# Patient Record
Sex: FEMALE | Race: WHITE | NOT HISPANIC OR LATINO | URBAN - METROPOLITAN AREA
[De-identification: names, ages, dates, MRNs, and addresses within clinical notes are randomized per-mention and may not be internally consistent; named-entity substitution may affect disease eponyms.]

---

## 2017-10-31 ENCOUNTER — IMPORTED ENCOUNTER (OUTPATIENT)
Dept: URBAN - METROPOLITAN AREA CLINIC 31 | Facility: CLINIC | Age: 72
End: 2017-10-31

## 2017-10-31 PROBLEM — H25.13: Noted: 2017-10-31

## 2017-10-31 PROBLEM — H04.123: Noted: 2017-10-31

## 2017-10-31 PROCEDURE — 92014 COMPRE OPH EXAM EST PT 1/>: CPT

## 2017-10-31 PROCEDURE — 92015 DETERMINE REFRACTIVE STATE: CPT

## 2017-10-31 NOTE — PATIENT DISCUSSION
1.  Dry Eye OU:  Continue current management with Artificial Tears. 2.  Nuclear Sclerotic Cataract OU: Getting worse. Left > Right. Minimal improvement by changing rx. Pt wants to change rx and defer sx as long as possible. Using her silhouettes for dist reads with nothing and using old glasses for computers. Explained how cataracts can effect vision. Recommend clinical observation. The patient was advised to contact us if any change or worsening of vision. 3. Large Rx change. changed last yr and lasted 6 mths. .  4.   Return 1 yr CE

## 2018-10-31 ENCOUNTER — IMPORTED ENCOUNTER (OUTPATIENT)
Dept: URBAN - METROPOLITAN AREA CLINIC 31 | Facility: CLINIC | Age: 73
End: 2018-10-31

## 2018-10-31 PROBLEM — H25.13: Noted: 2018-10-31

## 2018-10-31 PROBLEM — H04.123: Noted: 2018-10-31

## 2018-10-31 PROCEDURE — 92014 COMPRE OPH EXAM EST PT 1/>: CPT

## 2018-10-31 PROCEDURE — 92015 DETERMINE REFRACTIVE STATE: CPT

## 2018-10-31 NOTE — PATIENT DISCUSSION
1.  Dry Eye OU:  Continue current management with Artificial Tears. 2.  Nuclear Sclerotic Cataract OU: Getting worse. Left > Right. Minimal improvement by changing rx. Pt wants to change rx and defer sx as long as possible. Using her silhouettes for dist reads with nothing and using old glasses for computers. 3.  Large Rx change past few yrs but not this yr  Cataracts getting worse. changed last yr and lasted 6 mths. .  4. Return 1 yr CE  --Will be needing sx in next yr. Getting knee sx 11/18.

## 2019-10-10 ENCOUNTER — IMPORTED ENCOUNTER (OUTPATIENT)
Dept: URBAN - METROPOLITAN AREA CLINIC 31 | Facility: CLINIC | Age: 74
End: 2019-10-10

## 2019-10-10 PROBLEM — H04.123: Noted: 2019-10-10

## 2019-10-10 PROBLEM — H25.13: Noted: 2019-10-10

## 2019-10-10 PROCEDURE — 92014 COMPRE OPH EXAM EST PT 1/>: CPT

## 2019-10-10 PROCEDURE — 92015 DETERMINE REFRACTIVE STATE: CPT

## 2019-10-10 NOTE — PATIENT DISCUSSION
1.  Dry Eye OU:  Continue current management with Artificial Tears. 2.  Nuclear Sclerotic Cataract OU: Getting worse. Left > Right. Minimal improvement by changing rx. Pt wants to change rx and defer sx as long as possible. Using her silhouettes for dist reads with nothing and using old glasses for computers. 3.  Minimal rx change --Pt feels left had changed alot. expl rx change will not improve alot. 4.  Return 1 yr CE  --Will be needing sx in next yr. Getting knee sx 11/19 on other knee.

## 2020-10-06 NOTE — PATIENT DISCUSSION
"Patient reassured. No need for workup.   has seen them ""OS for years"" and episodes are infrequent. "

## 2020-11-30 ENCOUNTER — IMPORTED ENCOUNTER (OUTPATIENT)
Dept: URBAN - METROPOLITAN AREA CLINIC 31 | Facility: CLINIC | Age: 75
End: 2020-11-30

## 2020-11-30 PROBLEM — H04.123: Noted: 2020-11-30

## 2020-11-30 PROBLEM — H18.513: Noted: 2020-11-30

## 2020-11-30 PROBLEM — H25.13: Noted: 2020-11-30

## 2020-11-30 PROCEDURE — 92015 DETERMINE REFRACTIVE STATE: CPT

## 2020-11-30 PROCEDURE — 92014 COMPRE OPH EXAM EST PT 1/>: CPT

## 2020-11-30 NOTE — PATIENT DISCUSSION
1.  Fuchs Dystrophy OU: Recommend clinical observation. Disc Clint 128 drops in affected eye if worsening. Will need ECC prior to cat sx. 2.  Dry Eye OU:  Continue current management with Artificial Tears. 3.  Nuclear Sclerotic Cataract OU: Getting worse. Left > Right. Minimal improvement by changing rx. Pt wants to schedule Cat faithroxanna Thomson --Disc IOL options with pt. Pt is interested in MF IOL but understands with a corneal problem she may not be a good canddiate. Pt would be fine with dist correction and use reading rx. 4.  Minimal rx change --Cannot improve visions with rx change. 5.  RTN 1 mth Cat eval OU DR Thomson --Pt is primary  in household.

## 2020-11-30 NOTE — PATIENT DISCUSSION
1.  Dry Eye OU:  Continue current management with Artificial Tears. 2.  Nuclear Sclerotic Cataract OU: Getting worse. Left > Right. Minimal improvement by changing rx. Pt wants to schedule cta eval DR Thomson 3. Minimal rx change --Pt feels left had changed alot. expl rx change will not improve alot. 4.  Return 1 yr CE  --Will be needing sx in next yr.

## 2020-12-17 ENCOUNTER — IMPORTED ENCOUNTER (OUTPATIENT)
Dept: URBAN - METROPOLITAN AREA CLINIC 31 | Facility: CLINIC | Age: 75
End: 2020-12-17

## 2020-12-17 PROBLEM — H25.13: Noted: 2020-12-17

## 2020-12-17 PROBLEM — H18.513: Noted: 2020-12-17

## 2020-12-17 PROCEDURE — 99213 OFFICE O/P EST LOW 20 MIN: CPT

## 2020-12-17 NOTE — PATIENT DISCUSSION
Fuchs Dystrophy OU: Recommend clinical observation. Advised use of Clint 128 drops in affected eye if worsening.

## 2020-12-17 NOTE — PATIENT DISCUSSION
1.  Discussed the risks benefits alternatives and limitations of cataract surgery including infection bleeding loss of vision retinal tears detachment. The patient stated a full understanding and a desire to proceed with the procedure in both eyes. Refractive options were reviewed. Patient has elected to be optimized for distance vision in both eyes. The patient will still need glasses for reading and to possibly fine tune distance vision. Viscoat Schedule KPE/IOL os/od 2 weeks apart2. Fuchs Dystrophy OU: Recommend clinical observation. Advised use of Clint 128 drops in affected eye if worsening. 3. Return for an appointment for [address] [place] [address]. with Dr. Yosef Serrato.

## 2021-01-08 ENCOUNTER — IMPORTED ENCOUNTER (OUTPATIENT)
Dept: URBAN - METROPOLITAN AREA CLINIC 31 | Facility: CLINIC | Age: 76
End: 2021-01-08

## 2021-01-08 PROBLEM — H18.513: Noted: 2021-01-08

## 2021-01-08 PROBLEM — H25.13: Noted: 2021-01-08

## 2021-01-08 PROCEDURE — 92286 ANT SGM IMG I&R SPECLR MIC: CPT

## 2021-01-08 PROCEDURE — 92136 OPHTHALMIC BIOMETRY: CPT

## 2021-01-08 NOTE — PATIENT DISCUSSION
1.  Discussed the risks benefits alternatives and limitations of cataract surgery including infection bleeding loss of vision retinal tears detachment. The patient stated a full understanding and a desire to proceed with the procedure in both eyes. Refractive options were reviewed. Patient has elected to be optimized for distance vision in both eyes. The patient will still need glasses for reading and to possibly fine tune distance vision. 2. Fuchs Dystrophy OU: Recommend clinical observation. Advised use of Clint 128 drops in affected eye if worsening.

## 2021-01-26 ENCOUNTER — IMPORTED ENCOUNTER (OUTPATIENT)
Dept: URBAN - METROPOLITAN AREA CLINIC 31 | Facility: CLINIC | Age: 76
End: 2021-01-26

## 2021-01-26 PROBLEM — Z96.1: Noted: 2021-01-26

## 2021-01-26 PROCEDURE — 99024 POSTOP FOLLOW-UP VISIT: CPT

## 2021-01-26 NOTE — PATIENT DISCUSSION
1.  Pseudophake OS--Post-Op Day #1 - Cataract Surgery Left Eye (OS) - doing well. Pt is thrilled--Tears prn. Continue postop drops as directed. Call office with symptoms of pain redness or decreased vision in operative eye.  1 drop of tobramycin instilled-- use tears prn and Pataday for itchig qd.2. Fuchs Dystrophy--Use Clint 3. Cat OD--scheduled 2 weeks. 4.   RTN 1 week PO

## 2021-02-02 ENCOUNTER — IMPORTED ENCOUNTER (OUTPATIENT)
Dept: URBAN - METROPOLITAN AREA CLINIC 31 | Facility: CLINIC | Age: 76
End: 2021-02-02

## 2021-02-02 PROBLEM — Z96.1: Noted: 2021-02-02

## 2021-02-02 PROCEDURE — 99024 POSTOP FOLLOW-UP VISIT: CPT

## 2021-02-02 NOTE — PATIENT DISCUSSION
1.  Pseudophake OS--  1 week- Cataract Surgery Left Eye (OS) - doing well. Pt is thrilled--Tears prn. Continue postop drops as directed. Call office with symptoms of pain redness or decreased vision in operative eye. -- use tears prn and Pataday for itchig qd.2. Fuchs Dystrophy--Use Clint 3. Cat OD--scheduled 2 weeks. 4.   RTN 1 week PO

## 2021-02-09 ENCOUNTER — IMPORTED ENCOUNTER (OUTPATIENT)
Dept: URBAN - METROPOLITAN AREA CLINIC 31 | Facility: CLINIC | Age: 76
End: 2021-02-09

## 2021-02-09 PROBLEM — Z96.1: Noted: 2021-02-09

## 2021-02-09 PROCEDURE — 99024 POSTOP FOLLOW-UP VISIT: CPT

## 2021-02-09 NOTE — PATIENT DISCUSSION
1.  Pseudophakia OD---Post-Op Day #1 - Cataract Surgery Right Eye (OD) -DIST---  Explained corneal edema will resolve. doing well. Tears prn. Continue postop drops as directed. Call office with symptoms of pain redness or decreased vision in operative eye. 1 drop tobramycin instilled. 2. Pseudophake OS--  2 week- Cataract Surgery Left Eye (OS) - doing well. Pt is thrilled--Tears prn. Continue postop drops as directed. Call office with symptoms of pain redness or decreased vision in operative eye. -- use tears prn and Pataday for itchig qd.3. Fuchs Dystrophy--Use Clint 4.   RTN 1 week PO

## 2021-02-16 ENCOUNTER — IMPORTED ENCOUNTER (OUTPATIENT)
Dept: URBAN - METROPOLITAN AREA CLINIC 31 | Facility: CLINIC | Age: 76
End: 2021-02-16

## 2021-02-16 PROBLEM — Z96.1: Noted: 2021-02-16

## 2021-02-16 PROCEDURE — 99024 POSTOP FOLLOW-UP VISIT: CPT

## 2021-02-16 NOTE — PATIENT DISCUSSION
1.  Pseudophakia OD-- 1 week PO- Cataract Surgery Right Eye (OD) -DIST--- Doing great-- Explained corneal edema will resolve. doing well. Tears prn. Continue postop drops as directed. Call office with symptoms of pain redness or decreased vision in operative eye. 2. Pseudophake OS--  3 week- Cataract Surgery Left Eye (OS) - doing well. Pt is thrilled--Tears prn. Continue postop drops as directed. Call office with symptoms of pain redness or decreased vision in operative eye. -- use tears prn and Pataday for itchig qd.3. Fuchs Dystrophy--Use Clint 4. Pt happy with using OTC 2.25.5. RTN 5 MTHS CE--after cat sx ou.

## 2021-10-20 ENCOUNTER — IMPORTED ENCOUNTER (OUTPATIENT)
Dept: URBAN - METROPOLITAN AREA CLINIC 31 | Facility: CLINIC | Age: 76
End: 2021-10-20

## 2021-10-20 PROBLEM — Z96.1: Noted: 2021-10-20

## 2021-10-20 PROBLEM — H18.513: Noted: 2021-10-20

## 2021-10-20 PROBLEM — H04.123: Noted: 2021-10-20

## 2021-10-20 PROBLEM — H25.13: Noted: 2021-10-20

## 2021-10-20 PROCEDURE — 92015 DETERMINE REFRACTIVE STATE: CPT

## 2021-10-20 PROCEDURE — 92014 COMPRE OPH EXAM EST PT 1/>: CPT

## 2022-03-10 ENCOUNTER — IMPORTED ENCOUNTER (OUTPATIENT)
Dept: URBAN - METROPOLITAN AREA CLINIC 31 | Facility: CLINIC | Age: 77
End: 2022-03-10

## 2022-03-10 PROBLEM — H26.491: Noted: 2022-03-10

## 2022-03-10 PROBLEM — Z96.1: Noted: 2022-03-10

## 2022-03-10 PROBLEM — H26.492: Noted: 2022-03-10

## 2022-03-10 PROCEDURE — 99214 OFFICE O/P EST MOD 30 MIN: CPT

## 2022-03-10 PROCEDURE — 92015 DETERMINE REFRACTIVE STATE: CPT

## 2022-03-10 NOTE — PATIENT DISCUSSION
1.  PCO  OD (Posterior Capsule Opacification)   PCO is visually significant and impairment of vision does not meet the patient’s functional needs or interferes with activities of daily living. Risks benefits and alternatives to the Nd:YAG Laser reviewed including elevated IOP immediately postop and retinal tear/detachment. Patient to notify their ophthalmologist promptly if they have a significant change in symptoms such as flashes of light (photopsia) an increase in floaters loss of visual field or decrease in visual acuity after the procedure. Patient will be scheduled in [place] [id number] for Nd:YAG Laser. 2. PCO  OS: (Posterior Capsule Opacification)  Not visually significant at this time. Monitor for yag capsulotomy necessity. 3. Pseudophakia OU - IOLs stable. Monitor for changes in vision. 4. Followup for Surgery paperwork. for Yag OD  Sched in [place] with Dr. Amberly Montoya.

## 2022-04-01 ASSESSMENT — TONOMETRY
OS_IOP_MMHG: 17
OD_IOP_MMHG: 17
OD_IOP_MMHG: 16
OS_IOP_MMHG: 16
OS_IOP_MMHG: 17
OD_IOP_MMHG: 17
OD_IOP_MMHG: 16
OS_IOP_MMHG: 13
OS_IOP_MMHG: 16
OD_IOP_MMHG: 22
OD_IOP_MMHG: 17
OD_IOP_MMHG: 16
OS_IOP_MMHG: 16
OS_IOP_MMHG: 17
OD_IOP_MMHG: 16
OD_IOP_MMHG: 16
OS_IOP_MMHG: 16
OS_IOP_MMHG: 18
OS_IOP_MMHG: 16
OD_IOP_MMHG: 16

## 2022-04-01 ASSESSMENT — VISUAL ACUITY
OS_SC: 20/20
OD_GLARE: 20/50MED
OD_GLARE: 20/30-2
OD_CC: 20/40-2
OD_CC: 20/400
OD_SC: 20/25+2
OS_GLARE: 20/100
OS_SC: 20/30-1
OS_GLARE: 20/40
OS_CC: 20/25-2
OS_CC: 20/25+1
OD_GLARE: 20/50
OS_GLARE: 20/30-1
OD_GLARE: 20/100
OD_SC: 20/30
OD_SC: 20/400
OD_SC: 20/30
OD_CC: 20/400
OS_CC: J1+
OD_CC: 20/400
OS_CC: 20/20+3
OD_CC: J1
OS_CC: 20/25
OS_SC: 20/20-1
OS_SC: 20/400
OS_GLARE: 20/25MED

## 2022-04-08 ENCOUNTER — POST-OP (OUTPATIENT)
Dept: URBAN - METROPOLITAN AREA CLINIC 34 | Facility: CLINIC | Age: 77
End: 2022-04-08

## 2022-04-08 ENCOUNTER — PREPPED CHART (OUTPATIENT)
Dept: URBAN - METROPOLITAN AREA CLINIC 34 | Facility: CLINIC | Age: 77
End: 2022-04-08

## 2022-04-08 DIAGNOSIS — Z98.890: ICD-10-CM

## 2022-04-08 PROCEDURE — 99024 POSTOP FOLLOW-UP VISIT: CPT

## 2022-04-08 ASSESSMENT — TONOMETRY
OD_IOP_MMHG: 16
OS_IOP_MMHG: 16

## 2022-04-08 ASSESSMENT — VISUAL ACUITY
OD_SC: 20/20
OS_SC: 20/20

## 2022-04-08 NOTE — PATIENT DISCUSSION
1.  Pseudophakia OU - IOLs stable. Early PCO OD OS clear capsule. Monitor for changes in vision. 2. Fuchs Dystrophy OU: Recommend clinical observation. Disc Clint 128 drops in affected eye if worsening. Will need ECC prior to cat sx. 3.  Dry Eye OU:  Continue current management with Artificial Tears. 4.  No rx changes 5. RTN 4/22 DF --eval of capsule oDLeaving for [place] 6/22. spends summers there. --Pt is primary  in household.

## 2022-11-11 ENCOUNTER — ESTABLISHED PATIENT (OUTPATIENT)
Dept: URBAN - METROPOLITAN AREA CLINIC 34 | Facility: CLINIC | Age: 77
End: 2022-11-11

## 2022-11-11 DIAGNOSIS — H04.123: ICD-10-CM

## 2022-11-11 DIAGNOSIS — Z96.1: ICD-10-CM

## 2022-11-11 PROCEDURE — 99214 OFFICE O/P EST MOD 30 MIN: CPT

## 2022-11-11 ASSESSMENT — TONOMETRY
OD_IOP_MMHG: 16
OS_IOP_MMHG: 16

## 2022-11-11 ASSESSMENT — VISUAL ACUITY
OD_CC: J1
OS_CC: 20/20
OD_CC: 20/30
OS_CC: J1

## 2023-11-10 ENCOUNTER — COMPREHENSIVE EXAM (OUTPATIENT)
Dept: URBAN - METROPOLITAN AREA CLINIC 34 | Facility: CLINIC | Age: 78
End: 2023-11-10

## 2023-11-10 DIAGNOSIS — Z96.1: ICD-10-CM

## 2023-11-10 DIAGNOSIS — H04.123: ICD-10-CM

## 2023-11-10 PROCEDURE — 92014 COMPRE OPH EXAM EST PT 1/>: CPT

## 2023-11-10 PROCEDURE — 92015 DETERMINE REFRACTIVE STATE: CPT

## 2023-11-10 ASSESSMENT — VISUAL ACUITY
OD_CC: 20/20-3
OS_CC: 20/25
OD_CC: 20/30-1
OS_CC: 20/20-1

## 2023-11-10 ASSESSMENT — TONOMETRY
OD_IOP_MMHG: 18
OS_IOP_MMHG: 17

## 2024-04-15 ENCOUNTER — ESTABLISHED PATIENT (OUTPATIENT)
Dept: URBAN - METROPOLITAN AREA CLINIC 34 | Facility: CLINIC | Age: 79
End: 2024-04-15

## 2024-04-15 DIAGNOSIS — Z96.1: ICD-10-CM

## 2024-04-15 DIAGNOSIS — H04.123: ICD-10-CM

## 2024-04-15 PROCEDURE — 99213 OFFICE O/P EST LOW 20 MIN: CPT

## 2024-04-15 PROCEDURE — 83861 MICROFLUID ANALY TEARS: CPT

## 2024-04-15 PROCEDURE — 92015 DETERMINE REFRACTIVE STATE: CPT

## 2024-04-15 ASSESSMENT — VISUAL ACUITY
OD_CC: 20/25
OS_CC: J1
OD_CC: J1
OS_CC: 20/20

## 2024-05-01 ENCOUNTER — ESTABLISHED PATIENT (OUTPATIENT)
Dept: URBAN - METROPOLITAN AREA CLINIC 34 | Facility: CLINIC | Age: 79
End: 2024-05-01

## 2024-05-01 DIAGNOSIS — H04.123: ICD-10-CM

## 2024-05-01 DIAGNOSIS — Z96.1: ICD-10-CM

## 2024-05-01 PROCEDURE — 99213 OFFICE O/P EST LOW 20 MIN: CPT

## 2024-05-01 ASSESSMENT — VISUAL ACUITY
OD_CC: 20/20
OS_CC: 20/25

## 2024-05-01 ASSESSMENT — TONOMETRY
OS_IOP_MMHG: 17
OD_IOP_MMHG: 18

## 2024-10-24 ENCOUNTER — COMPREHENSIVE EXAM (OUTPATIENT)
Dept: URBAN - METROPOLITAN AREA CLINIC 34 | Facility: CLINIC | Age: 79
End: 2024-10-24

## 2024-10-24 DIAGNOSIS — H04.123: ICD-10-CM

## 2024-10-24 DIAGNOSIS — H18.513: ICD-10-CM

## 2024-10-24 DIAGNOSIS — Z96.1: ICD-10-CM

## 2024-10-24 PROCEDURE — 92014 COMPRE OPH EXAM EST PT 1/>: CPT

## 2024-10-24 PROCEDURE — 92015 DETERMINE REFRACTIVE STATE: CPT

## 2025-05-30 ENCOUNTER — FOLLOW UP (OUTPATIENT)
Age: 80
End: 2025-05-30

## 2025-05-30 DIAGNOSIS — H18.451: ICD-10-CM

## 2025-05-30 DIAGNOSIS — H04.123: ICD-10-CM

## 2025-05-30 DIAGNOSIS — Z96.1: ICD-10-CM

## 2025-05-30 PROCEDURE — 68761S PUNCTUM PLUG / SILICONE,EACH: Mod: 25,RT,E4,RT

## 2025-05-30 PROCEDURE — A4263 PERMANENT TEAR DUCT PLUG: HCPCS | Mod: 25,RT,E4,RT

## 2025-05-30 PROCEDURE — A4263 PERMANENT TEAR DUCT PLUG: HCPCS | Mod: 25,LT,E2,LT

## 2025-05-30 PROCEDURE — 99214 OFFICE O/P EST MOD 30 MIN: CPT

## 2025-05-30 PROCEDURE — 68761S PUNCTUM PLUG / SILICONE,EACH: Mod: 25,LT,E2,LT

## 2025-07-30 ENCOUNTER — FOLLOW UP (OUTPATIENT)
Age: 80
End: 2025-07-30

## 2025-07-30 DIAGNOSIS — H18.451: ICD-10-CM

## 2025-07-30 DIAGNOSIS — H04.123: ICD-10-CM

## 2025-07-30 DIAGNOSIS — Z96.1: ICD-10-CM

## 2025-07-30 PROCEDURE — 99213 OFFICE O/P EST LOW 20 MIN: CPT
